# Patient Record
Sex: MALE | Race: WHITE | NOT HISPANIC OR LATINO | URBAN - METROPOLITAN AREA
[De-identification: names, ages, dates, MRNs, and addresses within clinical notes are randomized per-mention and may not be internally consistent; named-entity substitution may affect disease eponyms.]

---

## 2017-07-15 ENCOUNTER — EMERGENCY (EMERGENCY)
Facility: HOSPITAL | Age: 32
LOS: 1 days | Discharge: HOME | End: 2017-07-15
Attending: EMERGENCY MEDICINE | Admitting: EMERGENCY MEDICINE
Payer: SELF-PAY

## 2017-07-15 VITALS
OXYGEN SATURATION: 98 % | RESPIRATION RATE: 18 BRPM | HEART RATE: 72 BPM | SYSTOLIC BLOOD PRESSURE: 120 MMHG | DIASTOLIC BLOOD PRESSURE: 79 MMHG | TEMPERATURE: 98 F

## 2017-07-15 VITALS
TEMPERATURE: 98 F | DIASTOLIC BLOOD PRESSURE: 96 MMHG | RESPIRATION RATE: 20 BRPM | HEART RATE: 110 BPM | SYSTOLIC BLOOD PRESSURE: 145 MMHG | OXYGEN SATURATION: 96 %

## 2017-07-15 DIAGNOSIS — F10.120 ALCOHOL ABUSE WITH INTOXICATION, UNCOMPLICATED: ICD-10-CM

## 2017-07-15 PROCEDURE — 99283 EMERGENCY DEPT VISIT LOW MDM: CPT | Mod: 25

## 2017-07-15 PROCEDURE — 99053 MED SERV 10PM-8AM 24 HR FAC: CPT

## 2017-07-15 NOTE — ED ADULT NURSE NOTE - CHPI ED SYMPTOMS NEG
no fever/no nausea/no vomiting/no pain/no tingling/no numbness/no decreased eating/drinking/no weakness/no dizziness/no chills

## 2017-07-15 NOTE — ED PROVIDER NOTE - OBJECTIVE STATEMENT
Pt BIB by EMS for ETOH intoxication.  Found beligerant on street with unsteady gait.  Admits to heavy ETOH use today, no other complaints.  Chewing gum - removed.  Placed in stretcher and quickly falls asleep.  Arouses to tactile stimuli and can give name but unable to answer other questions or cooperate with remainder of history. Pt BIB by EMS for ETOH intoxication.  Found belligerent on street with unsteady gait.  Admits to heavy ETOH use today, no other complaints.  Chewing gum - removed.  Placed in stretcher and quickly falls asleep.  Arouses to tactile stimuli and can give name but unable to answer other questions or cooperate with remainder of history.

## 2017-07-15 NOTE — ED ADULT TRIAGE NOTE - CHIEF COMPLAINT QUOTE
BIBA after being seen on the street with an unsteady gait and yelling. Came in with [facility], not in custody. Patient is ambulatory. Slurring his words. No signs of obvious injury noted.

## 2017-07-15 NOTE — ED PROVIDER NOTE - MEDICAL DECISION MAKING DETAILS
Alcohol intoxication, no signs of trauma, not hypoglycemic, neuro exam non-focal, will observe and reassess frequently.

## 2017-07-15 NOTE — ED ADULT NURSE NOTE - OBJECTIVE STATEMENT
pt. bought in by EMs due to ETOH intoxication. pt. shows no signs of visible trauma. awake to verbal stimuli.

## 2017-07-15 NOTE — ED PROVIDER NOTE - PROGRESS NOTE DETAILS
The patient is now awake and alert, clinically sober.  Able to walk a straight line.  Repeat exam and neuro/cranial nerve exams normal.  No evidence of head/neck trauma.  Patient denies any pain or other complaints.  Denies cp/sob/ha/abd pain.  Abd soft, lungs clear, heart exam normal.  Denzel po challenge.  Patient says only used alcohol no other substances.  Denies any assault.  Feels much better and pt feels safe for discharge.  No evidence of intoxication at this time or alcohol withdrawal.  No other complaints on discharge.

## 2017-07-15 NOTE — ED PROVIDER NOTE - PHYSICAL EXAMINATION
General: lethargic, arousable to touch, smells of alcohol  Head: NCAT  Eyes: PERRL  Heart: RRR  Lungs: CTAB  Abd: soft, NTND  Neuro: moves all 4 extremities equally  Skin: no e/o lacerations, abrasions, or ecchymoses

## 2019-09-13 NOTE — ED ADULT NURSE NOTE - GENITOURINARY WDL
Patient presents with complaint of 1-2 week history of dyspnea, frothy sputum. Hx: CHF in the past. Patietn visiting from [place]. Out of meds > 1 week. Seen at [facility] for same, and provided with Furosemide IVP and d/c home. Patient notes orthopnea, exertional dyspnea, and needing to sleep on his knees to assist in his breathing.    Bladder non-tender and non-distended. Urine clear yellow.
